# Patient Record
Sex: MALE | Race: OTHER | HISPANIC OR LATINO
[De-identification: names, ages, dates, MRNs, and addresses within clinical notes are randomized per-mention and may not be internally consistent; named-entity substitution may affect disease eponyms.]

---
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
## 2023-01-13 ENCOUNTER — RESULT CHARGE (OUTPATIENT)
Age: 18
End: 2023-01-13

## 2023-01-18 ENCOUNTER — APPOINTMENT (OUTPATIENT)
Dept: PEDIATRIC CARDIOLOGY | Facility: CLINIC | Age: 18
End: 2023-01-18
Payer: COMMERCIAL

## 2023-01-18 VITALS
SYSTOLIC BLOOD PRESSURE: 112 MMHG | DIASTOLIC BLOOD PRESSURE: 76 MMHG | WEIGHT: 181.66 LBS | BODY MASS INDEX: 29.2 KG/M2 | HEART RATE: 87 BPM | HEIGHT: 66.14 IN | OXYGEN SATURATION: 97 %

## 2023-01-18 PROCEDURE — 93320 DOPPLER ECHO COMPLETE: CPT

## 2023-01-18 PROCEDURE — 93303 ECHO TRANSTHORACIC: CPT

## 2023-01-18 PROCEDURE — 93000 ELECTROCARDIOGRAM COMPLETE: CPT

## 2023-01-18 PROCEDURE — 93325 DOPPLER ECHO COLOR FLOW MAPG: CPT

## 2023-01-18 PROCEDURE — 99214 OFFICE O/P EST MOD 30 MIN: CPT | Mod: 25

## 2023-01-18 NOTE — REASON FOR VISIT
[Follow-Up] : a follow-up visit for [Mother] : mother [S/P Catheterization] : status post catheterization [S/P EPS/Ablation] : status post EPS/Ablation [TOF/PA] : tetralogy of fallot with pulmonary atresia [Pulmonary Valve Insufficiency] : pulmonary valve insufficiency [Patient] : patient

## 2023-01-20 NOTE — DISCUSSION/SUMMARY
[Needs SBE Prophylaxis] : [unfilled]  needs bacterial endocarditis prophylaxis. SBE prophylaxis is indicated for dental and invasive ENT procedures. (Circulation. 2007; 116: 9327-4065) [Influenza vaccine is recommended] : Influenza vaccine is recommended [There are no changes in medication management.] : There are no changes in medication management [FreeTextEntry1] : In summary, Daniel is s/p repair of Tetralogy of Fallot, Pulmonary atresia, and s/p placement of a 25 mm bovine pericardial valve in the pulmonary position. He is now 3 1/2 years status post placement of a 26 mm Parker Rishabh valve in the pulmonary position; this was performed during a cardiac catheterization procedure on August 13, 2019. His cardiac evaluation today was notable for very mild ana-pulmonary valve stenosis, PSIG=14 mmHg; mean gradient=9 mmHg, and mild plus ana-pulmonary valve regurgitation. The estimated right ventricular pressure is approximately 21% systemic level, WNL. The right ventricle is globular and appears mildly dilated with mild global hypokinesia. His aortic root remains moderately dilated, z-score of 4.77, with only mild regurgitation. His LV ejection fraction remains normal at 57%. \par \par Daniel had a follow-up cardiac MRI/MRA in August 2021, (see above diagnostic test section for details on the results of this cardiac MRI/MRA).  The data on today's echocardiogram correlated quite nicely with the information obtained on this recent cardiac MRI/MRA.  At this time, Daniel has only mild neopulmonary stenosis and mild plus neopulmonary insufficiency with an estimated normal right ventricular pressure.  He has no evidence of branch pulmonary artery stenosis.  Also, his aortic root and ascending aorta are dilated, as is often seen in patients with tetralogy of Fallot.  These findings warrant close expectant follow-up, but no specific intervention at this point in time. He remains normotensive. \par  \par Daniel is asymptomatic today. He reports a very good activity level and has no complaints of dyspnea or shortness of breath with exertion. To date, we have documented no concerning arrhythmias and he has had no syncopal episodes.  A 24-hour Holter monitor was placed today and is currently pending.\par \par Daniel should remain on aspirin 325 mg once daily, indefinitely.  He denies excessive bruising or gum bleeding, on aspirin therapy.\par \par A follow-up cardiac MRI/MRA will be planned if clinically indicated, either by increasing neopulmonary valve gradients or worsening neopulmonary valve insufficiency.\par \par I cautioned Daniel about his weight, and I discussed the importance of a heart healthy diet, as well as the importance of regular aerobic exercise, with an eye towards preventive cardiology. I praised him on his willingness to work with a nutritionist, and his willingness to participate in more exercise related activities.  I emphasized the importance of consistent aerobic exercise at least 3-4 times per week.  I also emphasized the importance of excellent dental and skin hygiene, as prophylactic measures to prevent endocarditis. SBE prophylaxis is indicated. 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 I hope you find this information helpful to you.\par \par The above information was discussed with our cardiac interventionalist, Dr. Haro.  Expectant, close follow-up is recommended.

## 2023-01-20 NOTE — HISTORY OF PRESENT ILLNESS
[FreeTextEntry1] : Daniel Gandara was evaluated at the [facility] Daniel is now an almost 18 year-old young man who is followed in our division with a diagnosis of Tetralogy of Fallot and Pulmonary atresia. Daniel's last cardiac evaluation was on July 11, 2022. He is status post placement of a 26 mm Parker Rishabh valve in the pulmonary position. This was performed during a cardiac catheterization procedure on August 13, 2019. \par \par He was accompanied to the office visit today by his sister. He has received the COVID-19 vaccine (Pfizer), including the recommended boosters.  \par \par Cardiac history: Daniel is status post a right modified Juan-Taussig shunt performed on February 17, 2003. On July 7, 2003, he underwent repair of Tetralogy of Fallot with patch closure of the ventricular septal defect, takedown of the Juan-Taussig shunt and ligation of the patent ductus arteriosus. A transannular right ventricular outflow tract patch was placed at this time. On January 18, 2011, Dr. Riley placed a 25 mm bovine pericardial valve in the pulmonary valve position. A portion of the bovine pericardium was utilized as a patch for the right ventricular outflow tract. Over time, he developed mild to moderate ana-valvar pulmonary stenosis and increasing ana-pulmonary insufficiency.\par \par In December of 2017, Daniel had a follow-up cardiac MRI performed. The right ventricular ED volume was at the upper limits of normal (z-score =2.2); The RV ES volume was increased (z-score = 4.4). The left ventricular end systolic volume was mildly increased, z-score = 2.5. The left ventricular ejection fraction was 51%. The right ventricular ejection fraction was mildly to moderately decreased at 44%. 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 This area of the right ventricular outflow patch has poor contractility. Please see below for details.\par \par Because of an increasing gradient across the ana-pulmonary valve, increasing ana-pulmonary regurgitation, and decreased right ventricular systolic performance, Daniel was referred for a cardiac catheterization procedure on August 13, 2019. He was noted to have a 30 mm mercury gradient across the right ventricular outflow tract and moderate to severe pulmonary regurgitation. No intracardiac shunts were identified. His cardiac index was 2.6 L/min/m2. During this procedure a 26 mm Parker Rishabh valve was placed in the prior surgical valve, in the pulmonary position, with less than a 10 mm HG residual gradient and no pulmonary insufficiency. He tolerated the procedure well and was discharged home with no adverse sequelae.\par \par Daniel has been asymptomatic with reference to the cardiovascular system. He denies complaints of chest pain, palpitations, dizziness, shortness of breath or syncope. He remains overweight and is working hard to decrease his weight. He is trying to be more active.  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 He has been using his "home gym" equipment, and participating in at least 30 minutes of aerobic activity on Monday Wednesday and Friday.  He tells me he feels "amazing" during and after he exercises.  He has been trying to jog/walk briskly, every other day as well.\par \par His only medication includes aspirin 325 mg once daily.\par \par He has had no major intercurrent illnesses, hospitalizations or surgeries. 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 A brain MRI was performed, and by report was normal. He has some academic difficulties.  His last dental appointment was in November 2022.  He adheres to SBE prophylaxis. He is in need of this season's influenza vaccine. In the past, he was evaluated in pediatric endocrinology and found to have benign gynecomastia which required no further followup.  A review of systems was otherwise unremarkable.\par \par There has been no interval family history.

## 2023-01-20 NOTE — CARDIOLOGY SUMMARY
[Today's Date] : [unfilled] [LVSF ___%] : LV Shortening Fraction [unfilled]% [FreeTextEntry1] : An electrocardiogram today shows a normal sinus rhythm at a rate of 87 bpm, with a normal axis and a right bundle branch block pattern. There was no ectopy seen on the surface electrocardiogram. In summary, the EKG showed no significant interval change. [FreeTextEntry2] : A two-dimensional echocardiogram with Doppler evaluation shows no residual ventricular septal defect. The 26 mm Parker Rishabh valve was seen in the pulmonary position with very mild ana-pulmonary stenosis (PSIG of 14 mmHg, mean gradient of 9 mmHg). Mild plus ana-pulmonary regurgitation was seen. The proximal branch pulmonary arteries appeared normal. Color Doppler demonstrated flow into the proximal right and left branch pulmonary arteries. The aortic root was dilated and measured 4.4 cm in diameter consistent with a Z score of 4.77. Mild aortic valve regurgitation was noted.  The ascending aortic diameter was 4.2 cm, Z score of 5.35.  The right ventricle was globular and mildly dilated with mild global hypokinesia. The ventricular septal wall motion was dyskinetic/paradoxical. Qualitatively the left ventricular systolic function appeared normal. Mild tricuspid valve regurgitation was noted with an estimated right ventricular pressure of approximately 21% systemic level, no interval change. The LV ejection fraction by the 5/6*A*L method was normal at 57%.  The left ventricular volumes by this method were within normal limits. [de-identified] : pending\par \par December 6, 2021: This 24 Holter monitor revealed predominant normal sinus rhythm with a right bundle branch block pattern at a rate of 45 - 141 bpm. The average heart rate was 73 bpm. Rare isolated premature atrial contractions were noted; occasional late cycle, but multifocal, premature ventricular contractions were seen.  The PVCs were mostly isolated, with rare couplets.\par \par April 5, 2021: This 24 Holter monitor revealed predominant normal sinus rhythm with a right bundle branch block pattern at a rate of 45 - 140 bpm. The average heart rate was 78 bpm. Rare isolated premature atrial contractions were noted; occasional isolated, unifocal  premature ventricular contractions were seen.  A few PVCs were interpolated, most were late cycle.\par \par June 26, 2019: This 24 Holter monitor revealed predominant normal sinus rhythm with a right bundle branch block pattern at a rate of 49 - 160 bpm. The average heart rate was 89 bpm. Rare isolated premature atrial contractions were noted; occasional isolated, late cycle, unifocal  premature ventricular contractions were seen.\par \par 10/10/2018:This 24 Holter monitor revealed predominant normal sinus rhythm with a right bundle branch block pattern at a rate of 53 - 175 bpm. The average heart rate was 90 bpm. Rare isolated premature atrial contractions were noted;  occasional isolated, late cycle premature ventricular contractions were seen.\par \par 10/16/2017:  This 24 Holter monitor revealed predominant normal sinus rhythm with a right bundle branch block pattern at a rate of 49 - 171 bpm. The average heart rate was 82 bpm. Rare isolated premature atrial contractions were noted;  rare unifocal premature ventricular contractions were seen.\par \par 2/13/2017: This study revealed a predominant normal sinus rhythm with a right bundle branch block pattern. Rare premature atrial contractions were noted. Rare late cycle premature ventricular contractions of 2 morphologies were seen.\par \par 10/12/2015: The 24 hr Holter monitor revealed a predominant normal sinus rhythm with a right bundle branch block pattern. There were rare isolated premature atrial contractions and rare isolated, late cycle monomorphic premature ventricular contractions with no ventricular tachycardia. [de-identified] : August 4, 2021 [de-identified] : The cardiac MRI/MRA revealed mild plus ana-pulmonary regurgitation (18-24% PC; 21% by volume). The branch pulmonary arteries were of good size with no stenosis. There was an estimated flow of 54% of the right lung and 46% to the left lung.  The right ventricular ED volume (119 ml/m2) was at the upper limits of normal (z-score =2.3); The RV ES volume was increased. The left ventricular ejection fraction was 56.5%. The right ventricular ejection fraction was mildly decreased at 50%. 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 This area of the right ventricular outflow patch has poor contractility. The aortic root was dilated and measured 4.0 cm X 3.9 cm X 4.3 cm in systole (z-score=5.0). The ascending aorta was also dilated, 3.7 cm X 3.7 cm, Z score of 3.9. There was mild plus aortic regurgitation, RF of 23% by PC flows.\par \par December 20, 2017: The cardiac MRI/MRA revealed mild plus ana-pulmonary regurgitation (34% PC; 15% by volume). The branch pulmonary arteries were of good size with no stenosis. There was an estimated flow of 49% of the right lung and 51% to the left lung.  The right ventricular ED volume was at the upper limits of normal (z-score =2.2); The RV ES volume was increased (z-score = 4.4). The left ventricular ES volume was mildly increased, z-score = 2.5. The left ventricular ejection fraction was 51%. The right ventricular ejection fraction was mildly to moderately decreased at 44%. 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 This area of the right ventricular outflow patch has poor contractility. The aortic root was dilated and measured 3.6 cm X 3.7 cm X 4.1 cm in systole (z-score=5.0). The ascending aorta was also dilated. There was very mild aortic regurgitation.\par \par December 17, 2015:\par The cardiac MRI/MRA revealed minimal ana-pulmonary regurgitation (3%). The branch pulmonary arteries were of good size with no stenosis. There was an estimated flow of 60% of the right lung and 40% to the left lung.  The right ventricular and left ventricular volumes were normal. The left ventricular ejection fraction was 52%. The right ventricular ejection fraction was moderately decreased at 39%. 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 This area of the right ventricular outflow patch has poor contractility. The aortic root was dilated and measured 3.7 cm in systole. The ascending aorta was also dilated. [de-identified] : 8/13/2019 [FreeTextEntry3] :  He was noted to have a 30 mm mercury gradient across the right ventricular outflow tract and moderate to severe pulmonary regurgitation. No intracardiac shunts were identified. His cardiac index was 2.6 L/min/m2. During this procedure a 26 mm Edward Rishabh valve was placed in the prior surgical valve, in the pulmonary position, with less than a 10 mm HG residual gradient and no pulmonary insufficiency.

## 2023-01-20 NOTE — PHYSICAL EXAM
[General Appearance - Alert] : alert [General Appearance - In No Acute Distress] : in no acute distress [General Appearance - Well Developed] : well developed [General Appearance - Well-Appearing] : well appearing [Obese] : patient was observed to be obese [Outer Ear] : the ears and nose were normal in appearance [Examination Of The Oral Cavity] : mucous membranes were moist and pink [Respiration, Rhythm And Depth] : normal respiratory rhythm and effort [Auscultation Breath Sounds / Voice Sounds] : breath sounds clear to auscultation bilaterally [No Cough] : no cough [Chest Surgical / Traumatic Scar] : chest incision well healed [Sternotomy] : sternotomy [Heart Rate And Rhythm] : normal heart rate and rhythm [Heart Sounds Gallop] : no gallops [Heart Sounds Click] : no clicks [Arterial Pulses] : normal upper and lower extremity pulses with no pulse delay [Edema] : no edema [Capillary Refill Test] : normal capillary refill [Normal S1] : normal  [S2 Wide Splitting] : had wide splitting [Systolic] : systolic [II] : a grade 2/6 [LUSB] : LUSB [Ejection] : ejection [Diastolic] : diastolic [LMSB] : LMSB  [Abdomen Soft] : soft [Nail Clubbing] : no clubbing  or cyanosis of the fingernails [Abnormal Walk] : normal gait [] : no rash [Demonstrated Behavior - Infant Nonreactive To Parents] : interactive [Mood] : mood and affect were appropriate for age [Demonstrated Behavior] : normal behavior [I] : a grade 1/4  [FreeTextEntry1] : Striae on posterior thorax

## 2023-01-20 NOTE — CONSULT LETTER
[Today's Date] : [unfilled] [Name] : Name: [unfilled] [] : : ~~ [Today's Date:] : [unfilled] [Dear  ___:] : Dear Dr. [unfilled]: [Consult] : I had the pleasure of evaluating your patient, [unfilled]. My full evaluation follows. [Consult - Single Provider] : Thank you very much for allowing me to participate in the care of this patient. If you have any questions, please do not hesitate to contact me. [Sincerely,] : Sincerely, [FreeTextEntry4] : Marylou Gerardo MD [FreeTextEntry5] : [address] [facility] [address] [FreeTextEntry6] : [place] [address] [de-identified] : Lula Lim MD\par Pediatric Cardiologist\par [facility]\par [address] [place] [address]\par [place], [place]. [address]\par Phone: [phone]\par FAX: [phone]\par

## 2023-01-27 ENCOUNTER — APPOINTMENT (OUTPATIENT)
Dept: PEDIATRIC CARDIOLOGY | Facility: CLINIC | Age: 18
End: 2023-01-27
Payer: COMMERCIAL

## 2023-01-27 PROCEDURE — 93224 XTRNL ECG REC UP TO 48 HRS: CPT

## 2023-03-10 ENCOUNTER — OUTPATIENT (OUTPATIENT)
Dept: OUTPATIENT SERVICES | Age: 18
LOS: 1 days | End: 2023-03-10

## 2023-03-10 ENCOUNTER — APPOINTMENT (OUTPATIENT)
Dept: PEDIATRICS | Facility: CLINIC | Age: 18
End: 2023-03-10
Payer: COMMERCIAL

## 2023-03-10 VITALS
SYSTOLIC BLOOD PRESSURE: 102 MMHG | DIASTOLIC BLOOD PRESSURE: 57 MMHG | BODY MASS INDEX: 29.41 KG/M2 | HEIGHT: 66.22 IN | WEIGHT: 183 LBS | HEART RATE: 80 BPM

## 2023-03-10 DIAGNOSIS — Z98.890 OTHER SPECIFIED POSTPROCEDURAL STATES: Chronic | ICD-10-CM

## 2023-03-10 DIAGNOSIS — Q22.3 OTHER CONGENITAL MALFORMATIONS OF PULMONARY VALVE: Chronic | ICD-10-CM

## 2023-03-10 DIAGNOSIS — S76.012A STRAIN OF MUSCLE, FASCIA AND TENDON OF LEFT HIP, INITIAL ENCOUNTER: ICD-10-CM

## 2023-03-10 DIAGNOSIS — Z23 ENCOUNTER FOR IMMUNIZATION: ICD-10-CM

## 2023-03-10 DIAGNOSIS — Z87.2 PERSONAL HISTORY OF DISEASES OF THE SKIN AND SUBCUTANEOUS TISSUE: ICD-10-CM

## 2023-03-10 DIAGNOSIS — R43.0 ANOSMIA: ICD-10-CM

## 2023-03-10 DIAGNOSIS — Z86.39 PERSONAL HISTORY OF OTHER ENDOCRINE, NUTRITIONAL AND METABOLIC DISEASE: ICD-10-CM

## 2023-03-10 DIAGNOSIS — Z00.00 ENCOUNTER FOR GENERAL ADULT MEDICAL EXAMINATION W/OUT ABNORMAL FINDINGS: ICD-10-CM

## 2023-03-10 DIAGNOSIS — Z98.890 OTHER SPECIFIED POSTPROCEDURAL STATES: ICD-10-CM

## 2023-03-10 DIAGNOSIS — Z98.89 OTHER SPECIFIED POSTPROCEDURAL STATES: Chronic | ICD-10-CM

## 2023-03-10 PROCEDURE — 99395 PREV VISIT EST AGE 18-39: CPT | Mod: 25

## 2023-03-10 PROCEDURE — 90471 IMMUNIZATION ADMIN: CPT | Mod: NC

## 2023-03-10 PROCEDURE — 90686 IIV4 VACC NO PRSV 0.5 ML IM: CPT

## 2023-03-10 PROCEDURE — 96160 PT-FOCUSED HLTH RISK ASSMT: CPT | Mod: NC,59

## 2023-03-10 PROCEDURE — 99173 VISUAL ACUITY SCREEN: CPT | Mod: 59

## 2023-03-10 PROCEDURE — 96127 BRIEF EMOTIONAL/BEHAV ASSMT: CPT

## 2023-03-10 PROCEDURE — 92551 PURE TONE HEARING TEST AIR: CPT

## 2023-03-10 RX ORDER — AMOXICILLIN 500 MG/1
500 CAPSULE ORAL
Qty: 4 | Refills: 3 | Status: COMPLETED | COMMUNITY
Start: 2021-12-06 | End: 2023-03-10

## 2023-03-10 NOTE — RISK ASSESSMENT
[0] : 2) Feeling down, depressed, or hopeless: Not at all (0) [PHQ-2 Negative - No further assessment needed] : PHQ-2 Negative - No further assessment needed [Have you ever fainted, passed out or had an unexplained seizure suddenly and without warning, especially during exercise or in response] : Have you ever fainted, passed out or had an unexplained seizure suddenly and without warning, especially during exercise or in response to sudden loud noises such as doorbells, alarm clocks and ringing telephones? No [[id number]] : 0 [Have you ever had exercise-related chest pain or shortness of breath?] : Have you ever had exercise-related chest pain or shortness of breath? No [Has anyone in your immediate family (parents, grandparents, siblings) or other more distant relatives (aunts, uncles, cousins)  of heart] : Has anyone in your immediate family (parents, grandparents, siblings) or other more distant relatives (aunts, uncles, cousins)  of heart problems or had an unexpected sudden death before age 50 (This would include unexpected drownings, unexplained car accidents in which the relative was driving or sudden infant death syndrome.)? No [Are you related to anyone with hypertrophic cardiomyopathy or hypertrophic obstructive cardiomyopathy, Marfan syndrome, arrhythmogenic] : Are you related to anyone with hypertrophic cardiomyopathy or hypertrophic obstructive cardiomyopathy, Marfan syndrome, arrhythmogenic right ventricular cardiomyopathy, long QT syndrome, short QT syndrome, Brugada syndrome or catecholaminergic polymorphic ventricular tachycardia, or anyone younger than 50 years with a pacemaker or implantable defibrillator? No

## 2023-03-10 NOTE — DISCUSSION/SUMMARY
[] : The components of the vaccine(s) to be administered today are listed in the plan of care. The disease(s) for which the vaccine(s) are intended to prevent and the risks have been discussed with the caretaker.  The risks are also included in the appropriate vaccination information statements which have been provided to the patient's caregiver.  The caregiver has given consent to vaccinate. [Met privately with the adolescent for part of the office visit?] : Met privately with the adolescent for part of the office visit? Yes [Adolescent demonstrates understanding of his/her conditions and how to take prescribed medications?] : Adolescent demonstrates understanding of his/her conditions and how to take prescribed medications? Yes [Adolescent asks questions during each office  visit and participates in the care plan?] : Adolescent asks questions during each office visit and participates in the care plan? Yes [Adolescent is competent in independently making appointments, filling prescriptions, following up on referrals, and seeking emergency services, as needed?] : Adolescent is competent in independently making appointments, filling prescriptions, following up on referrals, and seeking emergency services, as needed? Yes [Adolescent's caregivers were provided with the opportunity to discuss their concerns about transferring decision making responsibility to the adolescent?] : Adolescent's caregivers were provided with the opportunity to discuss their concerns about transferring decision making responsibility to the adolescent? Yes [Initiated discussion about transfer to an adult healthcare provider?] : Initiated discussion about transfer to an adult healthcare provider? Yes  [Discussed choices for adult care and assist in identifying possible care providers?] : Discussed choices for adult care and assist in identifying possible care providers? Yes [FreeTextEntry1] : \par \par Daniel is a 17 yo male with history of tetralogy of Fallot s/p repair, pulmonary atresia s/p valve placement here for annual wellness physical.\par \par - asymptomatic, compliant with annual follow up with cardiology, only medication is aspirin 325mg qD\par - is planning to start a low-calorie diet. discussed heart-healthy foods such as legumes and leafy green vegetables, limiting fried foods, structuring diet plan as much as possible.\par - is planning to start daily 1 mile jogs with light weight lifting. Emphasized cardiology's recommendation regarding dilated aorta, to avoid heavy lifting activities such as push ups, lifting heavy weights, etc. \par - discussed sexual health, offered STD testing, discussed risk behaviors. \par - will give flu vaccine\par - due for surveillance labs: HgA1C, lipids, CBC\par - RTC in 1 year for annual well check or earlier if any concerns

## 2023-03-10 NOTE — PHYSICAL EXAM
[Alert] : alert [No Acute Distress] : no acute distress [Normocephalic] : normocephalic [EOMI Bilateral] : EOMI bilateral [Clear tympanic membranes with bony landmarks and light reflex present bilaterally] : clear tympanic membranes with bony landmarks and light reflex present bilaterally  [Pink Nasal Mucosa] : pink nasal mucosa [Nonerythematous Oropharynx] : nonerythematous oropharynx [Supple, full passive range of motion] : supple, full passive range of motion [No Palpable Masses] : no palpable masses [Clear to Auscultation Bilaterally] : clear to auscultation bilaterally [Regular Rate and Rhythm] : regular rate and rhythm [Normal S1, S2 audible] : normal S1, S2 audible [+2 Femoral Pulses] : +2 femoral pulses [NonTender] : non tender [Non Distended] : non distended [Normoactive Bowel Sounds] : normoactive bowel sounds [No Hepatomegaly] : no hepatomegaly [No Splenomegaly] : no splenomegaly [George: _____] : George [unfilled] [Uncircumcised] : uncircumcised [Foreskin easily retractable] : foreskin easily retractable [Bilateral descended testes] : bilateral descended testes [No Testicular Masses] : no testicular masses [No fissures] : no fissures [No Abnormal Lymph Nodes Palpated] : no abnormal lymph nodes palpated [Soft] : soft [Non Tender] : non tender [Mobile] : mobile [Normal Muscle Tone] : normal muscle tone [No Gait Asymmetry] : no gait asymmetry [No pain or deformities with palpation of bone, muscles, joints] : no pain or deformities with palpation of bone, muscles, joints [Straight] : straight [+2 Patella DTR] : +2 patella DTR [Cranial Nerves Grossly Intact] : cranial nerves grossly intact [No Rash or Lesions] : no rash or lesions [FreeTextEntry8] : Old, well-healed sternotomy scar

## 2023-03-10 NOTE — HISTORY OF PRESENT ILLNESS
[Mother] : mother [Eats meals with family] : eats meals with family [Has family members/adults to turn to for help] : has family members/adults to turn to for help [Is permitted and is able to make independent decisions] : Is permitted and is able to make independent decisions [Sleep Concerns] : sleep concerns [Grade: ____] : Grade: [unfilled] [Normal Performance] : normal performance [Normal Behavior/Attention] : normal behavior/attention [Normal Homework] : normal homework [Eats regular meals including adequate fruits and vegetables] : eats regular meals including adequate fruits and vegetables [Drinks non-sweetened liquids] : drinks non-sweetened liquids  [Calcium source] : calcium source [Has concerns about body or appearance] : has concerns about body or appearance [Has friends] : has friends [At least 1 hour of physical activity a day] : at least 1 hour of physical activity a day [Screen time (except homework) less than 2 hours a day] : screen time (except homework) less than 2 hours a day [Has interests/participates in community activities/volunteers] : has interests/participates in community activities/volunteers. [Uses safety belts/safety equipment] : uses safety belts/safety equipment  [Impaired/distracted driving] : impaired/distracted driving [Has peer relationships free of violence] : has peer relationships free of violence [HIV Screening Declined] : HIV Screening Declined [Has ways to cope with stress] : has ways to cope with stress [Displays self-confidence] : displays self-confidence [With Teen] : teen [No] : Patient does not go to dentist yearly [Up to date] : Up to date [Needs Immunizations] : needs immunizations [Uses electronic nicotine delivery system] : does not use electronic nicotine delivery system [Exposure to electronic nicotine delivery system] : no exposure to electronic nicotine delivery system [Uses tobacco] : does not use tobacco [Exposure to tobacco] : no exposure to tobacco [Uses drugs] : does not use drugs  [Exposure to drugs] : no exposure to drugs [Drinks alcohol] : does not drink alcohol [Exposure to alcohol] : no exposure to alcohol [Has problems with sleep] : does not have problems with sleep [Gets depressed, anxious, or irritable/has mood swings] : does not get depressed, anxious, or irritable/has mood swings [Has thought about hurting self or considered suicide] : has not thought about hurting self or considered suicide [FreeTextEntry7] : no UC or ED visits [de-identified] : none [de-identified] : will make an appointment [de-identified] : flu

## 2023-03-13 LAB
ALT SERPL-CCNC: 16 U/L
AST SERPL-CCNC: 8 U/L
BASOPHILS # BLD AUTO: 0.05 K/UL
BASOPHILS NFR BLD AUTO: 0.6 %
CHOLEST SERPL-MCNC: 116 MG/DL
EOSINOPHIL # BLD AUTO: 0.15 K/UL
EOSINOPHIL NFR BLD AUTO: 1.9 %
ESTIMATED AVERAGE GLUCOSE: 114 MG/DL
HBA1C MFR BLD HPLC: 5.6 %
HCT VFR BLD CALC: 43.6 %
HDLC SERPL-MCNC: 44 MG/DL
HGB BLD-MCNC: 14.8 G/DL
IMM GRANULOCYTES NFR BLD AUTO: 0.2 %
LDLC SERPL CALC-MCNC: 59 MG/DL
LYMPHOCYTES # BLD AUTO: 3.68 K/UL
LYMPHOCYTES NFR BLD AUTO: 45.4 %
MAN DIFF?: NORMAL
MCHC RBC-ENTMCNC: 30.6 PG
MCHC RBC-ENTMCNC: 33.9 GM/DL
MCV RBC AUTO: 90.1 FL
MONOCYTES # BLD AUTO: 0.63 K/UL
MONOCYTES NFR BLD AUTO: 7.8 %
NEUTROPHILS # BLD AUTO: 3.57 K/UL
NEUTROPHILS NFR BLD AUTO: 44.1 %
NONHDLC SERPL-MCNC: 73 MG/DL
PLATELET # BLD AUTO: 339 K/UL
RBC # BLD: 4.84 M/UL
RBC # FLD: 12.5 %
TRIGL SERPL-MCNC: 66 MG/DL
WBC # FLD AUTO: 8.1 K/UL

## 2023-03-14 DIAGNOSIS — Q21.3 TETRALOGY OF FALLOT: ICD-10-CM

## 2023-03-14 DIAGNOSIS — Z23 ENCOUNTER FOR IMMUNIZATION: ICD-10-CM

## 2023-03-14 DIAGNOSIS — Z00.00 ENCOUNTER FOR GENERAL ADULT MEDICAL EXAMINATION WITHOUT ABNORMAL FINDINGS: ICD-10-CM

## 2023-03-14 DIAGNOSIS — Z13.31 ENCOUNTER FOR SCREENING FOR DEPRESSION: ICD-10-CM

## 2023-03-14 DIAGNOSIS — E66.3 OVERWEIGHT: ICD-10-CM

## 2023-07-17 ENCOUNTER — APPOINTMENT (OUTPATIENT)
Dept: PEDIATRIC CARDIOLOGY | Facility: CLINIC | Age: 18
End: 2023-07-17
Payer: COMMERCIAL

## 2023-07-17 VITALS — DIASTOLIC BLOOD PRESSURE: 71 MMHG | SYSTOLIC BLOOD PRESSURE: 103 MMHG

## 2023-07-17 VITALS
HEIGHT: 66.14 IN | DIASTOLIC BLOOD PRESSURE: 78 MMHG | SYSTOLIC BLOOD PRESSURE: 126 MMHG | OXYGEN SATURATION: 98 % | WEIGHT: 189.38 LBS | HEART RATE: 72 BPM | BODY MASS INDEX: 30.44 KG/M2

## 2023-07-17 DIAGNOSIS — I77.819 AORTIC ECTASIA, UNSPECIFIED SITE: ICD-10-CM

## 2023-07-17 PROCEDURE — 93303 ECHO TRANSTHORACIC: CPT

## 2023-07-17 PROCEDURE — 99214 OFFICE O/P EST MOD 30 MIN: CPT | Mod: 25

## 2023-07-17 PROCEDURE — 93321 DOPPLER ECHO F-UP/LMTD STD: CPT | Mod: 59

## 2023-07-17 PROCEDURE — 93000 ELECTROCARDIOGRAM COMPLETE: CPT

## 2023-07-17 PROCEDURE — 93325 DOPPLER ECHO COLOR FLOW MAPG: CPT | Mod: 59

## 2023-07-17 PROCEDURE — 93304 ECHO TRANSTHORACIC: CPT

## 2023-07-17 PROCEDURE — 93320 DOPPLER ECHO COMPLETE: CPT

## 2023-07-17 NOTE — REASON FOR VISIT
[Follow-Up] : a follow-up visit for [Mother] : mother [S/P Cardiac Surgery] : status post cardiac surgery [S/P Catheterization] : status post catheterization [TOF/PA] : tetralogy of fallot with pulmonary atresia

## 2023-07-18 NOTE — CONSULT LETTER
[Today's Date] : [unfilled] [Name] : Name: [unfilled] [] : : ~~ [Today's Date:] : [unfilled] [Dear  ___:] : Dear Dr. [unfilled]: [Consult] : I had the pleasure of evaluating your patient, [unfilled]. My full evaluation follows. [Consult - Single Provider] : Thank you very much for allowing me to participate in the care of this patient. If you have any questions, please do not hesitate to contact me. [Sincerely,] : Sincerely, [FreeTextEntry4] : Alan Chung [FreeTextEntry5] : [address], [FreeTextEntry6] :  [place] [address] [FreeTextEnqsq1] : [phone] [de-identified] : Lula Lim MD\par Pediatric Cardiologist\par [facility]\par [address] [place] [address]\par [place], [place]. [address]\par Phone: [phone]\par FAX: [phone]\par

## 2023-07-18 NOTE — CARDIOLOGY SUMMARY
[Today's Date] : [unfilled] [LVSF ___%] : LV Shortening Fraction [unfilled]% [FreeTextEntry1] : An electrocardiogram today shows a normal sinus rhythm at a rate of 71 bpm, with a normal axis and a right bundle branch block pattern. There was no ectopy seen on the surface electrocardiogram. In summary, the EKG showed no significant interval change. [FreeTextEntry2] : A two-dimensional echocardiogram with Doppler evaluation shows no residual ventricular septal defect. The 26 mm Parker Rishabh valve was seen in the pulmonary position with very mild ana-pulmonary stenosis (PSIG of 11 mmHg, mean gradient of 8 mmHg). Mild plus ana-pulmonary regurgitation was seen. The proximal branch pulmonary arteries appeared normal. Color Doppler demonstrated flow into the proximal right and left branch pulmonary arteries. The aortic root was dilated and measured 4.4 cm in diameter consistent with a Z score of 4.54. Mild aortic valve regurgitation was noted.  The ascending aortic diameter was 4.2 cm, Z score of 5.3.  The right ventricle was globular and mildly dilated with mild global hypokinesia. The ventricular septal wall motion was dyskinetic/paradoxical. Qualitatively the left ventricular systolic function appeared normal. Mild tricuspid valve regurgitation was noted with an estimated right ventricular pressure of approximately 21% systemic level, no interval change. The LV ejection fraction by the 5/6*A*L method was normal at 57%.  The left ventricular volumes by this method were within normal limits. No interval change. [de-identified] : January 18, 2023  [de-identified] : This 24 Holter monitor revealed predominant normal sinus rhythm with a right bundle branch block pattern at a rate of 47 - 205 bpm. The average heart rate was 93 bpm. Rare isolated premature atrial contractions were noted; occasional late cycle, premature ventricular contractions were seen\par \par December 6, 2021: This 24 Holter monitor revealed predominant normal sinus rhythm with a right bundle branch block pattern at a rate of 45 - 141 bpm. The average heart rate was 73 bpm. Rare isolated premature atrial contractions were noted; occasional late cycle, but multifocal, premature ventricular contractions were seen.  The PVCs were mostly isolated, with rare couplets.\par \par April 5, 2021: This 24 Holter monitor revealed predominant normal sinus rhythm with a right bundle branch block pattern at a rate of 45 - 140 bpm. The average heart rate was 78 bpm. Rare isolated premature atrial contractions were noted; occasional isolated, unifocal  premature ventricular contractions were seen.  A few PVCs were interpolated, most were late cycle.\par \par June 26, 2019: This 24 Holter monitor revealed predominant normal sinus rhythm with a right bundle branch block pattern at a rate of 49 - 160 bpm. The average heart rate was 89 bpm. Rare isolated premature atrial contractions were noted; occasional isolated, late cycle, unifocal  premature ventricular contractions were seen.\par \par 10/10/2018:This 24 Holter monitor revealed predominant normal sinus rhythm with a right bundle branch block pattern at a rate of 53 - 175 bpm. The average heart rate was 90 bpm. Rare isolated premature atrial contractions were noted;  occasional isolated, late cycle premature ventricular contractions were seen.\par \par 10/16/2017:  This 24 Holter monitor revealed predominant normal sinus rhythm with a right bundle branch block pattern at a rate of 49 - 171 bpm. The average heart rate was 82 bpm. Rare isolated premature atrial contractions were noted;  rare unifocal premature ventricular contractions were seen.\par \par 2/13/2017: This study revealed a predominant normal sinus rhythm with a right bundle branch block pattern. Rare premature atrial contractions were noted. Rare late cycle premature ventricular contractions of 2 morphologies were seen.\par \par 10/12/2015: The 24 hr Holter monitor revealed a predominant normal sinus rhythm with a right bundle branch block pattern. There were rare isolated premature atrial contractions and rare isolated, late cycle monomorphic premature ventricular contractions with no ventricular tachycardia. [de-identified] : August 4, 2021 [de-identified] : The cardiac MRI/MRA revealed mild plus ana-pulmonary regurgitation (18-24% PC; 21% by volume). The branch pulmonary arteries were of good size with no stenosis. There was an estimated flow of 54% of the right lung and 46% to the left lung.  The right ventricular ED volume (119 ml/m2) was at the upper limits of normal (z-score =2.3); The RV ES volume was increased. The left ventricular ejection fraction was 56.5%. The right ventricular ejection fraction was mildly decreased at 50%. 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 This area of the right ventricular outflow patch has poor contractility. The aortic root was dilated and measured 4.0 cm X 3.9 cm X 4.3 cm in systole (z-score=5.0). The ascending aorta was also dilated, 3.7 cm X 3.7 cm, Z score of 3.9. There was mild plus aortic regurgitation, RF of 23% by PC flows.\par \par December 20, 2017: The cardiac MRI/MRA revealed mild plus ana-pulmonary regurgitation (34% PC; 15% by volume). The branch pulmonary arteries were of good size with no stenosis. There was an estimated flow of 49% of the right lung and 51% to the left lung.  The right ventricular ED volume was at the upper limits of normal (z-score =2.2); The RV ES volume was increased (z-score = 4.4). The left ventricular ES volume was mildly increased, z-score = 2.5. The left ventricular ejection fraction was 51%. The right ventricular ejection fraction was mildly to moderately decreased at 44%. 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 This area of the right ventricular outflow patch has poor contractility. The aortic root was dilated and measured 3.6 cm X 3.7 cm X 4.1 cm in systole (z-score=5.0). The ascending aorta was also dilated. There was very mild aortic regurgitation.\par \par December 17, 2015:\par The cardiac MRI/MRA revealed minimal ana-pulmonary regurgitation (3%). The branch pulmonary arteries were of good size with no stenosis. There was an estimated flow of 60% of the right lung and 40% to the left lung.  The right ventricular and left ventricular volumes were normal. The left ventricular ejection fraction was 52%. The right ventricular ejection fraction was moderately decreased at 39%. 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 This area of the right ventricular outflow patch has poor contractility. The aortic root was dilated and measured 3.7 cm in systole. The ascending aorta was also dilated. [de-identified] : 8/13/2019 [FreeTextEntry3] :  He was noted to have a 30 mm mercury gradient across the right ventricular outflow tract and moderate to severe pulmonary regurgitation. No intracardiac shunts were identified. His cardiac index was 2.6 L/min/m2. During this procedure a 26 mm Edward Rishabh valve was placed in the prior surgical valve, in the pulmonary position, with less than a 10 mm HG residual gradient and no pulmonary insufficiency.

## 2023-07-18 NOTE — PHYSICAL EXAM
[General Appearance - Alert] : alert [General Appearance - In No Acute Distress] : in no acute distress [General Appearance - Well Developed] : well developed [General Appearance - Well-Appearing] : well appearing [Obese] : patient was observed to be obese [Outer Ear] : the ears and nose were normal in appearance [Examination Of The Oral Cavity] : mucous membranes were moist and pink [Oropharynx] : the oropharynx was normal [Respiration, Rhythm And Depth] : normal respiratory rhythm and effort [Auscultation Breath Sounds / Voice Sounds] : breath sounds clear to auscultation bilaterally [No Cough] : no cough [Chest Surgical / Traumatic Scar] : chest incision well healed [Sternotomy] : sternotomy [Heart Rate And Rhythm] : normal heart rate and rhythm [Heart Sounds Gallop] : no gallops [Arterial Pulses] : normal upper and lower extremity pulses with no pulse delay [Heart Sounds Click] : no clicks [Edema] : no edema [Capillary Refill Test] : normal capillary refill [Normal S1] : normal  [S2 Wide Splitting] : had wide splitting [Systolic] : systolic [LUSB] : LUSB [Ejection] : ejection [Diastolic] : diastolic [II] : a grade 2/4  [LMSB] : LMSB  [Abdomen Soft] : soft [Nail Clubbing] : no clubbing  or cyanosis of the fingernails [Abnormal Walk] : normal gait [] : no rash [Demonstrated Behavior - Infant Nonreactive To Parents] : interactive [Mood] : mood and affect were appropriate for age [Demonstrated Behavior] : normal behavior [FreeTextEntry1] : Striae on posterior thorax

## 2023-07-18 NOTE — DISCUSSION/SUMMARY
[Needs SBE Prophylaxis] : [unfilled]  needs bacterial endocarditis prophylaxis. SBE prophylaxis is indicated for dental and invasive ENT procedures. (Circulation. 2007; 116: 4028-7440) [Influenza vaccine is recommended] : Influenza vaccine is recommended [There are no changes in medication management.] : There are no changes in medication management [FreeTextEntry1] : In summary, Daniel is s/p repair of Tetralogy of Fallot, Pulmonary atresia, and s/p placement of a 25 mm bovine pericardial valve in the pulmonary position. He is now ~4 years status post placement of a 26 mm Parker Rishabh valve in the pulmonary position; this was performed during a cardiac catheterization procedure on August 13, 2019. His cardiac evaluation today was notable for no significant ana-pulmonary valve stenosis, PSIG=11 mmHg; mean gradient=8 mmHg, and mild plus ana-pulmonary valve regurgitation. The estimated right ventricular pressure is approximately 21% systemic level, WNL. The right ventricle is globular and appears mildly dilated with mild global hypokinesia. His aortic root remains moderately dilated and measures 4.4 cm, z-score of 4.54, with only mild regurgitation. His LV ejection fraction remains normal at 57%.  In summary, his echocardiogram today shows no interval change compared to that obtained in January 2023.\par \par Daniel had a follow-up cardiac MRI/MRA in August 2021, (see above diagnostic test section for details on the results of this cardiac MRI/MRA).  The data on today's echocardiogram correlated quite nicely with the information obtained on this recent cardiac MRI/MRA.  At this time, Daniel has only mild neopulmonary stenosis and mild plus neopulmonary insufficiency with an estimated normal right ventricular pressure.  He has no evidence of branch pulmonary artery stenosis.  Also, his aortic root and ascending aorta are dilated, as is often seen in patients with tetralogy of Fallot.  These findings warrant close expectant follow-up, but no specific intervention at this point in time. He remains normotensive. \par  \par Daniel remains asymptomatic. He reports a very good activity level and has no complaints of dyspnea or shortness of breath with exertion. To date, we have documented no concerning arrhythmias and he has had no syncopal episodes.  His last 24-hour Holter monitor from January 2023, showed no concerning arrhythmias.\par \par Daniel should remain on aspirin 325 mg once daily, indefinitely.  He denies excessive bruising or gum bleeding, on aspirin therapy.\par \par A follow-up cardiac MRI/MRA will be planned if clinically indicated, either by increasing neopulmonary valve gradients or worsening neopulmonary valve insufficiency.\par \par I cautioned Daniel about his weight, and I discussed the importance of a heart healthy diet, as well as the importance of regular aerobic exercise, with an eye towards preventive cardiology. I praised him on his willingness to work with a nutritionist, and his willingness to participate in more exercise related activities.  I emphasized the importance of consistent aerobic exercise at least 3-4 times per week.  I also emphasized the importance of excellent dental and skin hygiene, as prophylactic measures to prevent endocarditis. SBE prophylaxis is indicated. 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 I hope you find this information helpful to you.\par \par In January 2023, the above information was discussed with our cardiac interventionalist, Dr. Haro.  Expectant, close follow-up is recommended.

## 2023-07-18 NOTE — HISTORY OF PRESENT ILLNESS
[FreeTextEntry1] : Daniel Gandara was evaluated at the [facility] on July 17, 2023.  Daniel is now a 20 1/2year-old young man who is followed in our division with a diagnosis of Tetralogy of Fallot and Pulmonary atresia. Daniel's last cardiac evaluation was on January 18, 2023.  He is status post placement of a 26 mm Parker Rishabh valve in the pulmonary position. This was performed during a cardiac catheterization procedure on August 13, 2019. \par \par He was accompanied to the office visit today by his mother.   \par \par Cardiac history: Daniel is status post a right modified Juan-Taussig shunt performed on February 17, 2003. On July 7, 2003, he underwent repair of Tetralogy of Fallot with patch closure of the ventricular septal defect, takedown of the Juan-Taussig shunt and ligation of the patent ductus arteriosus. A transannular right ventricular outflow tract patch was placed at this time. On January 18, 2011, Dr. Riley placed a 25 mm bovine pericardial valve in the pulmonary valve position. A portion of the bovine pericardium was utilized as a patch for the right ventricular outflow tract. Over time, he developed mild to moderate ana-valvar pulmonary stenosis and increasing ana-pulmonary insufficiency.\par \par In December of 2017, Daniel had a follow-up cardiac MRI performed. The right ventricular ED volume was at the upper limits of normal (z-score =2.2); The RV ES volume was increased (z-score = 4.4). The left ventricular end systolic volume was mildly increased, z-score = 2.5. The left ventricular ejection fraction was 51%. The right ventricular ejection fraction was mildly to moderately decreased at 44%. 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 This area of the right ventricular outflow patch has poor contractility. Please see below for details.\par \par Because of an increasing gradient across the ana-pulmonary valve, increasing ana-pulmonary regurgitation, and decreased right ventricular systolic performance, Daniel was referred for a cardiac catheterization procedure on August 13, 2019. He was noted to have a 30 mm mercury gradient across the right ventricular outflow tract and moderate to severe pulmonary regurgitation. No intracardiac shunts were identified. His cardiac index was 2.6 L/min/m2. During this procedure a 26 mm Parker Rishabh valve was placed in the prior surgical valve, in the pulmonary position, with less than a 10 mm HG residual gradient and no pulmonary insufficiency. He tolerated the procedure well and was discharged home with no adverse sequelae.\par \par Daniel has been asymptomatic with reference to the cardiovascular system. He denies complaints of chest pain, palpitations, dizziness, shortness of breath or syncope. He remains overweight and is working hard to decrease his weight. He is trying to be more active.  He is in his third year of college at [email or web address] studying mechanical engineering. He has been trying to jog/walk briskly, as a form of exercise.\par \par His only medication includes aspirin 325 mg once daily.\par \par He has had no major intercurrent illnesses, hospitalizations or surgeries. 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 A brain MRI was performed, and by report was normal. He has some academic difficulties.  His next dental appointment is at the end of July, 2023.  He adheres to SBE prophylaxis.  He has received the COVID-19 vaccine (Pfizer), including the recommended boosters. In the past, he was evaluated in pediatric endocrinology and found to have benign gynecomastia which required no further followup.  A review of systems was otherwise unremarkable.\par \par There has been no interval family history.

## 2023-07-19 ENCOUNTER — APPOINTMENT (OUTPATIENT)
Dept: PEDIATRIC CARDIOLOGY | Facility: CLINIC | Age: 18
End: 2023-07-19

## 2023-12-20 ENCOUNTER — RESULT CHARGE (OUTPATIENT)
Age: 18
End: 2023-12-20

## 2024-01-02 ENCOUNTER — APPOINTMENT (OUTPATIENT)
Dept: PEDIATRIC CARDIOLOGY | Facility: CLINIC | Age: 19
End: 2024-01-02
Payer: COMMERCIAL

## 2024-01-02 VITALS
WEIGHT: 208.34 LBS | OXYGEN SATURATION: 98 % | SYSTOLIC BLOOD PRESSURE: 122 MMHG | RESPIRATION RATE: 18 BRPM | BODY MASS INDEX: 33.09 KG/M2 | HEART RATE: 85 BPM | HEIGHT: 66.54 IN | DIASTOLIC BLOOD PRESSURE: 79 MMHG

## 2024-01-02 PROCEDURE — 99215 OFFICE O/P EST HI 40 MIN: CPT | Mod: 25

## 2024-01-02 PROCEDURE — 93303 ECHO TRANSTHORACIC: CPT

## 2024-01-02 PROCEDURE — 93000 ELECTROCARDIOGRAM COMPLETE: CPT

## 2024-01-02 PROCEDURE — 93320 DOPPLER ECHO COMPLETE: CPT

## 2024-01-02 PROCEDURE — 93325 DOPPLER ECHO COLOR FLOW MAPG: CPT

## 2024-01-02 RX ORDER — AMOXICILLIN 500 MG/1
500 TABLET, FILM COATED ORAL
Qty: 4 | Refills: 3 | Status: ACTIVE | COMMUNITY
Start: 2023-07-17 | End: 1900-01-01

## 2024-01-03 NOTE — DISCUSSION/SUMMARY
[There are no changes in medication management.] : There are no changes in medication management [Needs SBE Prophylaxis] : [unfilled]  needs bacterial endocarditis prophylaxis. SBE prophylaxis is indicated for dental and invasive ENT procedures. (Circulation. 2007; 116: 9782[address]0747) [Influenza vaccine is recommended] : Influenza vaccine is recommended [FreeTextEntry1] : In summary, Daniel is s/p repair of Tetralogy of Fallot, Pulmonary atresia, and s/p placement of a 25 mm bovine pericardial valve in the pulmonary position. He is now ~4 1/2 years status post placement of a 26 mm Parker Rishabh valve in the pulmonary position; this was performed during a cardiac catheterization procedure on August 13, 2019. His cardiac evaluation today was notable for no significant ana-pulmonary valve stenosis, PSIG=16 mmHg; mean gradient=10 mmHg, and mild to moderate ana-pulmonary valve regurgitation. The estimated right ventricular pressure is approximately 21% systemic level, WNL. The right ventricle is globular and appears mildly dilated with mild global hypokinesia. His aortic root remains moderately dilated and measures 4.2 cm, z-score of 3.4, with only mild regurgitation. His LV ejection fraction remains normal at 59%.  In summary, his echocardiogram today shows no significant interval change compared to that obtained in January 2023.  Daniel had a follow-up cardiac MRI/MRA in August 2021, (see above diagnostic test section for details on the results of this cardiac MRI/MRA).  The data on today's echocardiogram correlated quite nicely with the information obtained on this recent cardiac MRI/MRA.  At this time, Daniel has only mild neopulmonary stenosis and mild plus neopulmonary insufficiency with an estimated normal right ventricular pressure.  He has no evidence of branch pulmonary artery stenosis.  Also, his aortic root and ascending aorta are dilated, as is often seen in patients with tetralogy of Fallot.  These findings warrant close expectant follow-up, but no specific intervention at this point in time. He remains normotensive.    Daniel remains asymptomatic. He reports a very good activity level and has no complaints of dyspnea or shortness of breath with exertion. To date, we have documented no concerning arrhythmias and he has had no syncopal episodes.  His last 24-hour Holter monitor from January 2023, showed no concerning arrhythmias.  A 24-hour Holter monitor was placed today and is currently pending.  Daniel should remain on aspirin 325 mg once daily, indefinitely.  He denies excessive bruising or gum bleeding, on aspirin therapy.  A follow-up cardiac MRI/MRA will be planned if clinically indicated, either by increasing neopulmonary valve gradients or worsening neopulmonary valve insufficiency.  I cautioned Daniel about his weight, and I discussed the importance of a heart healthy diet, as well as the importance of regular aerobic exercise, with an eye towards preventive cardiology. I emphasized the importance of consistent aerobic exercise at least 3-4 times per week.  I also emphasized the importance of excellent dental and skin hygiene, as prophylactic measures to prevent endocarditis. SBE prophylaxis is indicated. I reviewed with Daniel and his family that should he develop any protracted, unexplained fevers, that they should contact our office for advice and evaluation.  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  Today, I discussed at length with Daniel and his mother, the importance of transitioning his cardiology care to our adult congenital heart disease program.  He and his mother are in agreement with this transition.  They were provided with the contact information to make their next appointment in 6 months with Dr. Manuela Feng in the [facility] clinic.  I hope you find this information helpful to you.  Total time spent today included reviewing diagnosis and treatment plan/monitoring, review of prior notes, review of medications and monitoring for side effects, review of last labs with patient/family, plan for continued symptom and laboratory monitoring as ordered, and time spent with patient/parent. Reviewed recent chart notes from other providers and medical records as well. This excludes time spent on procedures.

## 2024-01-03 NOTE — CONSULT LETTER
[Today's Date] : [unfilled] [Name] : Name: [unfilled] [] : : ~~ [Today's Date:] : [unfilled] [Dear  ___:] : Dear Dr. [unfilled]: [Consult] : I had the pleasure of evaluating your patient, [unfilled]. My full evaluation follows. [Consult - Single Provider] : Thank you very much for allowing me to participate in the care of this patient. If you have any questions, please do not hesitate to contact me. [Sincerely,] : Sincerely, [FreeTextEntry4] : [facility] [FreeTextEntry5] :  [address] [FreeTextEntry6] :  [place] [address] [FreeTextEntry7] : [phone] [de-identified] : Lula Lim MD Pediatric Cardiologist [facility], [address], [place] [address] Phone: [phone] FAX: [phone]

## 2024-01-03 NOTE — PHYSICAL EXAM
[General Appearance - Alert] : alert [General Appearance - In No Acute Distress] : in no acute distress [General Appearance - Well Developed] : well developed [General Appearance - Well-Appearing] : well appearing [Obese] : patient was observed to be obese [Outer Ear] : the ears and nose were normal in appearance [Examination Of The Oral Cavity] : mucous membranes were moist and pink [Oropharynx] : the oropharynx was normal [Respiration, Rhythm And Depth] : normal respiratory rhythm and effort [Auscultation Breath Sounds / Voice Sounds] : breath sounds clear to auscultation bilaterally [No Cough] : no cough [Chest Surgical / Traumatic Scar] : chest incision well healed [Sternotomy] : sternotomy [Heart Rate And Rhythm] : normal heart rate and rhythm [Heart Sounds Gallop] : no gallops [Heart Sounds Click] : no clicks [Arterial Pulses] : normal upper and lower extremity pulses with no pulse delay [Edema] : no edema [Capillary Refill Test] : normal capillary refill [Normal S1] : normal  [S2 Wide Splitting] : had wide splitting [Systolic] : systolic [LUSB] : LUSB [Ejection] : ejection [Diastolic] : diastolic [II] : a grade 2/4  [LMSB] : LMSB  [Abdomen Soft] : soft [Nail Clubbing] : no clubbing  or cyanosis of the fingernails [Abnormal Walk] : normal gait [] : no rash [Demonstrated Behavior - Infant Nonreactive To Parents] : interactive [Demonstrated Behavior] : normal behavior [Mood] : mood and affect were appropriate for age [FreeTextEntry1] : Striae on posterior thorax

## 2024-01-03 NOTE — REASON FOR VISIT
[Follow-Up] : a follow-up visit for [Patient] : patient [Mother] : mother [FreeTextEntry3] : s/p cardiac surgery, s/p catheterization, TOF w/ pulmonary atresia

## 2024-01-03 NOTE — CARDIOLOGY SUMMARY
[Today's Date] : [unfilled] [LVSF ___%] : LV Shortening Fraction [unfilled]% [de-identified] : January 18, 2023  [FreeTextEntry1] : An electrocardiogram today shows a normal sinus rhythm at a rate of 76 bpm, with a normal axis and a right bundle branch block pattern. There was no ectopy seen on the surface electrocardiogram. In summary, the EKG showed no significant interval change. [FreeTextEntry2] : Summary:  1. 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 2. Status post takedown of right modified Juan-Taussig shunt, status post closure of anterior  malalignment ventricular septal defect and right ventricular outflow tract transannular patch. 3. No residual ventricular septal defect. 4. Status post surgical pulmonary valve replacement with bovine pericardial valve. January 18, 2011. 5. S/P placement of a 26 mm Rishabh valve in the pulmonary position, 8/13/2019. 6. Very mild neopulmonary stenosis. 7. Peak pulmonary valve gradient = 16.2 mmHg (mean grad = 9.8 mmHg). 8. Mild to moderate pulmonary valve regurgitation. 9. Mild tricuspid valve regurgitation, peak systolic instantaneous gradient 18.7 mmHg. 10. Right ventricular systolic pressure estimate = 23.7 mmHg (estimated right atrial pressure of 5 mmHg). 11. The estimated right ventricular pressure is approximately 21% systemic level, WNL. - no interval  change. 12. Normal right branch pulmonary artery, continuity of the left and right branch pulmonary arteries and the left branch pulmonary artery was not well visualized. 13. Normal Doppler profile in right pulmonary artery and normal Doppler profile in left pulmonary artery. 14. Color Doppler demonstrates flow into the proximal right and left branch pulmonary arteries. 15. Mildly dilated aortic root. 16. Aortic sinuses of Valsalva dimension (systole) = 4.2 cm (z = 3.44). 17. Mild aortic valve regurgitation. 18. The aortic insufficiency jet is eccentric and directed towards the VSD patch.  19. The sino-tubular junction is effaced. 20. Moderately dilated ascending aorta. 21. Ascending aorta dimension (systole) = 4.44 cm (z = 5.67). 22. Trivial mitral valve regurgitation. 23. There is dyskinetic, paradoxical ventricular septal wall motion. 24. Normal left ventricular size, morphology and systolic function. 25. Left ventricular ejection fraction by 5/6 Area x Length is normal at 59 %. 26. The LV volumes by this method were WNL. 27. The right ventricular myocardium appears mildly myopathic. The RV is globular and mildly to  moderately dilated.  28. Mild global hypokinesia of the right ventricle. 29. No pericardial effusion. [de-identified] : pending  January 18, 2023: This 24 Holter monitor revealed predominant normal sinus rhythm with a right bundle branch block pattern at a rate of 47 - 205 bpm. The average heart rate was 93 bpm. Rare isolated premature atrial contractions were noted; occasional late cycle, premature ventricular contractions were seen.  December 6, 2021: This 24 Holter monitor revealed predominant normal sinus rhythm with a right bundle branch block pattern at a rate of 45 - 141 bpm. The average heart rate was 73 bpm. Rare isolated premature atrial contractions were noted; occasional late cycle, but multifocal, premature ventricular contractions were seen.  The PVCs were mostly isolated, with rare couplets.  April 5, 2021: This 24 Holter monitor revealed predominant normal sinus rhythm with a right bundle branch block pattern at a rate of 45 - 140 bpm. The average heart rate was 78 bpm. Rare isolated premature atrial contractions were noted; occasional isolated, unifocal  premature ventricular contractions were seen.  A few PVCs were interpolated, most were late cycle.  June 26, 2019: This 24 Holter monitor revealed predominant normal sinus rhythm with a right bundle branch block pattern at a rate of 49 - 160 bpm. The average heart rate was 89 bpm. Rare isolated premature atrial contractions were noted; occasional isolated, late cycle, unifocal  premature ventricular contractions were seen.  10/10/2018:This 24 Holter monitor revealed predominant normal sinus rhythm with a right bundle branch block pattern at a rate of 53 - 175 bpm. The average heart rate was 90 bpm. Rare isolated premature atrial contractions were noted;  occasional isolated, late cycle premature ventricular contractions were seen.  10/16/2017:  This 24 Holter monitor revealed predominant normal sinus rhythm with a right bundle branch block pattern at a rate of 49 - 171 bpm. The average heart rate was 82 bpm. Rare isolated premature atrial contractions were noted;  rare unifocal premature ventricular contractions were seen.  2/13/2017: This study revealed a predominant normal sinus rhythm with a right bundle branch block pattern. Rare premature atrial contractions were noted. Rare late cycle premature ventricular contractions of 2 morphologies were seen.  10/12/2015: The 24 hr Holter monitor revealed a predominant normal sinus rhythm with a right bundle branch block pattern. There were rare isolated premature atrial contractions and rare isolated, late cycle monomorphic premature ventricular contractions with no ventricular tachycardia. [de-identified] : August 4, 2021 [de-identified] : The cardiac MRI/MRA revealed mild plus ana-pulmonary regurgitation (18-24% PC; 21% by volume). The branch pulmonary arteries were of good size with no stenosis. There was an estimated flow of 54% of the right lung and 46% to the left lung.  The right ventricular ED volume (119 ml/m2) was at the upper limits of normal (z-score =2.3); The RV ES volume was increased. The left ventricular ejection fraction was 56.5%. The right ventricular ejection fraction was mildly decreased at 50%. 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 This area of the right ventricular outflow patch has poor contractility. The aortic root was dilated and measured 4.0 cm X 3.9 cm X 4.3 cm in systole (z-score=5.0). The ascending aorta was also dilated, 3.7 cm X 3.7 cm, Z score of 3.9. There was mild plus aortic regurgitation, RF of 23% by PC flows.\par  \par  December 20, 2017: The cardiac MRI/MRA revealed mild plus ana-pulmonary regurgitation (34% PC; 15% by volume). The branch pulmonary arteries were of good size with no stenosis. There was an estimated flow of 49% of the right lung and 51% to the left lung.  The right ventricular ED volume was at the upper limits of normal (z-score =2.2); The RV ES volume was increased (z-score = 4.4). The left ventricular ES volume was mildly increased, z-score = 2.5. The left ventricular ejection fraction was 51%. The right ventricular ejection fraction was mildly to moderately decreased at 44%. 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 This area of the right ventricular outflow patch has poor contractility. The aortic root was dilated and measured 3.6 cm X 3.7 cm X 4.1 cm in systole (z-score=5.0). The ascending aorta was also dilated. There was very mild aortic regurgitation.\par  \par  December 17, 2015:\par  The cardiac MRI/MRA revealed minimal ana-pulmonary regurgitation (3%). The branch pulmonary arteries were of good size with no stenosis. There was an estimated flow of 60% of the right lung and 40% to the left lung.  The right ventricular and left ventricular volumes were normal. The left ventricular ejection fraction was 52%. The right ventricular ejection fraction was moderately decreased at 39%. 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 This area of the right ventricular outflow patch has poor contractility. The aortic root was dilated and measured 3.7 cm in systole. The ascending aorta was also dilated. [de-identified] : 8/13/2019 [FreeTextEntry3] :  He was noted to have a 30 mm mercury gradient across the right ventricular outflow tract and moderate to severe pulmonary regurgitation. No intracardiac shunts were identified. His cardiac index was 2.6 L/min/m2. During this procedure a 26 mm Edward Rishabh valve was placed in the prior surgical valve, in the pulmonary position, with less than a 10 mm HG residual gradient and no pulmonary insufficiency.

## 2024-01-03 NOTE — HISTORY OF PRESENT ILLNESS
[FreeTextEntry1] : Daniel Gandara was evaluated at the [facility] on January 2, 2024. Daniel is now an almost 19 year-old young man who is followed in our division with a diagnosis of Tetralogy of Fallot and Pulmonary atresia. Daniel's last cardiac evaluation was on July 17, 2023. He is status post placement of a 26 mm Parker Rishabh valve in the pulmonary position. This was performed during a cardiac catheterization procedure on August 13, 2019.   He was accompanied to the office visit today by his mother.     Cardiac history: Daniel is status post a right modified Juan-Taussig shunt performed on February 17, 2003. On July 7, 2003, he underwent repair of Tetralogy of Fallot with patch closure of the ventricular septal defect, takedown of the Juan-Taussig shunt and ligation of the patent ductus arteriosus. A transannular right ventricular outflow tract patch was placed at this time. On January 18, 2011, Dr. Riley placed a 25 mm bovine pericardial valve in the pulmonary valve position. A portion of the bovine pericardium was utilized as a patch for the right ventricular outflow tract. Over time, he developed mild to moderate ana-valvar pulmonary stenosis and increasing ana-pulmonary insufficiency.  In December of 2017, Daniel had a follow-up cardiac MRI performed. The right ventricular ED volume was at the upper limits of normal (z-score =2.2); The RV ES volume was increased (z-score = 4.4). The left ventricular end systolic volume was mildly increased, z-score = 2.5. The left ventricular ejection fraction was 51%. The right ventricular ejection fraction was mildly to moderately decreased at 44%. 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 This area of the right ventricular outflow patch has poor contractility. Please see below for details.  Because of an increasing gradient across the ana-pulmonary valve, increasing ana-pulmonary regurgitation, and decreased right ventricular systolic performance, Daniel was referred for a cardiac catheterization procedure on August 13, 2019. He was noted to have a 30 mm mercury gradient across the right ventricular outflow tract and moderate to severe pulmonary regurgitation. No intracardiac shunts were identified. His cardiac index was 2.6 L/min/m2. During this procedure a 26 mm Parker Rishabh valve was placed in the prior surgical valve, in the pulmonary position, with less than a 10 mm HG residual gradient and no pulmonary insufficiency. He tolerated the procedure well and was discharged home with no adverse sequelae.  Daniel has been asymptomatic with reference to the cardiovascular system. He denies complaints of chest pain, palpitations, dizziness, shortness of breath or syncope. He remains overweight; indeed, he has gained approximately 10 kg since July 2023.  He is trying to be more active and trying to make more healthy food choices.  He is in his third year of college at [employer] studying mechanical engineering. He plans to go to the gym with his sister several times a week, going forward.  His only medication includes aspirin 325 mg once daily.  He has had no major intercurrent illnesses, hospitalizations or surgeries. 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 A brain MRI was performed, and by report was normal. He has some academic difficulties.  He adheres to by annual dental evaluations.  He adheres to SBE prophylaxis for dental procedures.  He has received the COVID-19 vaccine (Pfizer); he has received this season's influenza vaccine.  In the past, he was evaluated in pediatric endocrinology and found to have benign gynecomastia which required no further followup.  A review of systems was otherwise unremarkable.  There has been no interval family history.

## 2024-01-17 ENCOUNTER — APPOINTMENT (OUTPATIENT)
Dept: PEDIATRIC CARDIOLOGY | Facility: CLINIC | Age: 19
End: 2024-01-17

## 2024-06-02 ENCOUNTER — RESULT CHARGE (OUTPATIENT)
Age: 19
End: 2024-06-02

## 2024-06-11 ENCOUNTER — APPOINTMENT (OUTPATIENT)
Dept: PEDIATRIC CARDIOLOGY | Facility: CLINIC | Age: 19
End: 2024-06-11
Payer: COMMERCIAL

## 2024-06-11 VITALS
BODY MASS INDEX: 31.51 KG/M2 | HEART RATE: 68 BPM | HEIGHT: 66.54 IN | OXYGEN SATURATION: 97 % | DIASTOLIC BLOOD PRESSURE: 76 MMHG | WEIGHT: 198.42 LBS | SYSTOLIC BLOOD PRESSURE: 110 MMHG

## 2024-06-11 DIAGNOSIS — E66.9 OBESITY, UNSPECIFIED: ICD-10-CM

## 2024-06-11 DIAGNOSIS — Z98.890 OTHER SPECIFIED POSTPROCEDURAL STATES: ICD-10-CM

## 2024-06-11 DIAGNOSIS — Q21.3 TETRALOGY OF FALLOT: ICD-10-CM

## 2024-06-11 DIAGNOSIS — I37.1 NONRHEUMATIC PULMONARY VALVE INSUFFICIENCY: ICD-10-CM

## 2024-06-11 DIAGNOSIS — Q22.0 PULMONARY VALVE ATRESIA: ICD-10-CM

## 2024-06-11 PROCEDURE — 93320 DOPPLER ECHO COMPLETE: CPT

## 2024-06-11 PROCEDURE — 93303 ECHO TRANSTHORACIC: CPT

## 2024-06-11 PROCEDURE — 93325 DOPPLER ECHO COLOR FLOW MAPG: CPT

## 2024-06-11 PROCEDURE — 99214 OFFICE O/P EST MOD 30 MIN: CPT | Mod: 25

## 2024-06-11 PROCEDURE — 93000 ELECTROCARDIOGRAM COMPLETE: CPT

## 2024-06-11 NOTE — REASON FOR VISIT
[Follow-Up] : a follow-up visit for [S/P Cardiac Surgery] : status post cardiac surgery [S/P Catheterization] : status post catheterization [Tetralogy Of Fallot] : Tetralogy of Fallot [Pulmonary Valve Insufficiency] : pulmonary valve insufficiency [Patient] : patient [Mother] : mother

## 2024-06-12 PROBLEM — Z98.890 STATUS POST CARDIAC SURGERY: Status: ACTIVE | Noted: 2023-07-17

## 2024-06-12 PROBLEM — E66.9 OBESITY (BMI 30.0-34.9): Status: ACTIVE | Noted: 2024-01-02

## 2024-06-12 PROBLEM — I37.1 PULMONARY REGURGITATION: Status: ACTIVE | Noted: 2019-06-28

## 2024-06-12 NOTE — PHYSICAL EXAM
[General Appearance - Alert] : alert [General Appearance - In No Acute Distress] : in no acute distress [General Appearance - Well Developed] : well developed [General Appearance - Well-Appearing] : well appearing [Obese] : patient was observed to be obese [Outer Ear] : the ears and nose were normal in appearance [Examination Of The Oral Cavity] : mucous membranes were moist and pink [Oropharynx] : the oropharynx was normal [Respiration, Rhythm And Depth] : normal respiratory rhythm and effort [Auscultation Breath Sounds / Voice Sounds] : breath sounds clear to auscultation bilaterally [No Cough] : no cough [Chest Surgical / Traumatic Scar] : chest incision well healed [Sternotomy] : sternotomy [Heart Rate And Rhythm] : normal heart rate and rhythm [Heart Sounds Gallop] : no gallops [Arterial Pulses] : normal upper and lower extremity pulses with no pulse delay [Heart Sounds Click] : no clicks [Edema] : no edema [Capillary Refill Test] : normal capillary refill [Normal S1] : normal  [S2 Wide Splitting] : had wide splitting [Systolic] : systolic [II] : a grade 2/6 [LUSB] : LUSB [Ejection] : ejection [Abdomen Soft] : soft [Abnormal Walk] : normal gait [Nail Clubbing] : no clubbing  or cyanosis of the fingernails [] : no rash [Demonstrated Behavior - Infant Nonreactive To Parents] : interactive [Mood] : mood and affect were appropriate for age [Demonstrated Behavior] : normal behavior [FreeTextEntry1] : Striae on posterior thorax

## 2024-06-12 NOTE — CARDIOLOGY SUMMARY
[LVSF ___%] : LV Shortening Fraction [unfilled]% [Today's Date] : [unfilled] [FreeTextEntry1] : An electrocardiogram today shows a normal sinus rhythm at a rate of 68 bpm, with a normal axis and a right bundle branch block pattern. There was no ectopy seen on the surface electrocardiogram. In summary, the EKG showed no significant interval change. [FreeTextEntry2] : Summary:  1. 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 2. Status post takedown of right modified Juan-Taussig shunt, status post closure of anterior  malalignment ventricular septal defect and right ventricular outflow tract transannular patch. 3. No residual ventricular septal defect. 4. Status post surgical pulmonary valve replacement with bovine pericardial valve - January 18, 2011. 5. S/P placement of a 26 mm Rishabh valve in the pulmonary position, 8/13/2019. 6. Very mild neopulmonary stenosis. 7. Peak pulmonary valve gradient = 15.0 mmHg (mean grad = 7.5 mmHg). 8. Mild to moderate pulmonary valve regurgitation. 9. Mild tricuspid valve regurgitation, peak systolic instantaneous gradient 22.4 mmHg. 10. Right ventricular systolic pressure estimate = 27.4 mmHg (estimated right atrial pressure of 5 mmHg). 11. The estimated right ventricular pressure is approximately 27% systemic level, WNL. - no interval  change. 12. Normal right branch pulmonary artery, continuity of the left and right branch pulmonary arteries and normal left branch pulmonary artery. 13. Normal Doppler profile in right pulmonary artery and normal Doppler profile in left pulmonary artery. 14. Color Doppler demonstrates flow into the proximal right and left branch pulmonary arteries. 15. Mildly dilated aortic root. 16. Aortic sinuses of Valsalva dimension (systole) = 4.3 cm (z = 3.87). 17. Mild aortic valve regurgitation. 18. The aortic insufficiency jet is eccentric, and directed towards the VSD patch.  The aortic pressure half time is 841 msec, consistent with mild aortic insufficiency. 19. The sino-tubular junction is effaced. 20. Moderately dilated ascending aorta. 21. Ascending aorta dimension (systole) = 3.76 cm (z = 3.55). 22. Trivial mitral valve regurgitation. 23. There is dyskinetic, paradoxical ventricular septal wall motion. 24. The right ventricular myocardium appears mildly myopathic. The RV is globular and mildly to  moderately dilated. The decreased systolic excursion is more pronounced at the apex of the right  ventricle. 25. Mild global hypokinesia of the right ventricle. 26. Normal left ventricular size, morphology and systolic function. 27. Left ventricular ejection fraction by 5/6 Area x Length is normal at 57 %. 28. The LV volumes by this method were WNL. 29. No pericardial effusion. [de-identified] : pending  January 18, 2023: This 24 Holter monitor revealed predominant normal sinus rhythm with a right bundle branch block pattern at a rate of 47 - 205 bpm. The average heart rate was 93 bpm. Rare isolated premature atrial contractions were noted; occasional late cycle, premature ventricular contractions were seen.  December 6, 2021: This 24 Holter monitor revealed predominant normal sinus rhythm with a right bundle branch block pattern at a rate of 45 - 141 bpm. The average heart rate was 73 bpm. Rare isolated premature atrial contractions were noted; occasional late cycle, but multifocal, premature ventricular contractions were seen.  The PVCs were mostly isolated, with rare couplets.  April 5, 2021: This 24 Holter monitor revealed predominant normal sinus rhythm with a right bundle branch block pattern at a rate of 45 - 140 bpm. The average heart rate was 78 bpm. Rare isolated premature atrial contractions were noted; occasional isolated, unifocal  premature ventricular contractions were seen.  A few PVCs were interpolated, most were late cycle.  June 26, 2019: This 24 Holter monitor revealed predominant normal sinus rhythm with a right bundle branch block pattern at a rate of 49 - 160 bpm. The average heart rate was 89 bpm. Rare isolated premature atrial contractions were noted; occasional isolated, late cycle, unifocal  premature ventricular contractions were seen.  10/10/2018:This 24 Holter monitor revealed predominant normal sinus rhythm with a right bundle branch block pattern at a rate of 53 - 175 bpm. The average heart rate was 90 bpm. Rare isolated premature atrial contractions were noted;  occasional isolated, late cycle premature ventricular contractions were seen.  10/16/2017:  This 24 Holter monitor revealed predominant normal sinus rhythm with a right bundle branch block pattern at a rate of 49 - 171 bpm. The average heart rate was 82 bpm. Rare isolated premature atrial contractions were noted;  rare unifocal premature ventricular contractions were seen.  2/13/2017: This study revealed a predominant normal sinus rhythm with a right bundle branch block pattern. Rare premature atrial contractions were noted. Rare late cycle premature ventricular contractions of 2 morphologies were seen.  10/12/2015: The 24 hr Holter monitor revealed a predominant normal sinus rhythm with a right bundle branch block pattern. There were rare isolated premature atrial contractions and rare isolated, late cycle monomorphic premature ventricular contractions with no ventricular tachycardia. [de-identified] : August 4, 2021 [de-identified] : The cardiac MRI/MRA revealed mild plus ana-pulmonary regurgitation (18-24% PC; 21% by volume). The branch pulmonary arteries were of good size with no stenosis. There was an estimated flow of 54% of the right lung and 46% to the left lung.  The right ventricular ED volume (119 ml/m2) was at the upper limits of normal (z-score =2.3); The RV ES volume was increased. The left ventricular ejection fraction was 56.5%. The right ventricular ejection fraction was mildly decreased at 50%. 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 This area of the right ventricular outflow patch has poor contractility. The aortic root was dilated and measured 4.0 cm X 3.9 cm X 4.3 cm in systole (z-score=5.0). The ascending aorta was also dilated, 3.7 cm X 3.7 cm, Z score of 3.9. There was mild plus aortic regurgitation, RF of 23% by PC flows.\par  \par  December 20, 2017: The cardiac MRI/MRA revealed mild plus ana-pulmonary regurgitation (34% PC; 15% by volume). The branch pulmonary arteries were of good size with no stenosis. There was an estimated flow of 49% of the right lung and 51% to the left lung.  The right ventricular ED volume was at the upper limits of normal (z-score =2.2); The RV ES volume was increased (z-score = 4.4). The left ventricular ES volume was mildly increased, z-score = 2.5. The left ventricular ejection fraction was 51%. The right ventricular ejection fraction was mildly to moderately decreased at 44%. 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 This area of the right ventricular outflow patch has poor contractility. The aortic root was dilated and measured 3.6 cm X 3.7 cm X 4.1 cm in systole (z-score=5.0). The ascending aorta was also dilated. There was very mild aortic regurgitation.\par  \par  December 17, 2015:\par  The cardiac MRI/MRA revealed minimal ana-pulmonary regurgitation (3%). The branch pulmonary arteries were of good size with no stenosis. There was an estimated flow of 60% of the right lung and 40% to the left lung.  The right ventricular and left ventricular volumes were normal. The left ventricular ejection fraction was 52%. The right ventricular ejection fraction was moderately decreased at 39%. 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 This area of the right ventricular outflow patch has poor contractility. The aortic root was dilated and measured 3.7 cm in systole. The ascending aorta was also dilated. [de-identified] : 8/13/2019 [FreeTextEntry3] :  He was noted to have a 30 mm mercury gradient across the right ventricular outflow tract and moderate to severe pulmonary regurgitation. No intracardiac shunts were identified. His cardiac index was 2.6 L/min/m2. During this procedure a 26 mm Edward Rishabh valve was placed in the prior surgical valve, in the pulmonary position, with less than a 10 mm HG residual gradient and no pulmonary insufficiency.

## 2024-06-12 NOTE — DISCUSSION/SUMMARY
[Needs SBE Prophylaxis] : [unfilled]  needs bacterial endocarditis prophylaxis. SBE prophylaxis is indicated for dental and invasive ENT procedures. (Circulation. 2007; 116: 0547-7199) [Influenza vaccine is recommended] : Influenza vaccine is recommended [There are no changes in medication management.] : There are no changes in medication management [FreeTextEntry1] : In summary, Daniel is s/p repair of Tetralogy of Fallot, Pulmonary atresia, and s/p placement of a 25 mm bovine pericardial valve in the pulmonary position. He is now ~5 years status post placement of a 26 mm Parker Rishabh valve in the pulmonary position; this was performed during a cardiac catheterization procedure on August 13, 2019. His cardiac evaluation today was notable for no significant ana-pulmonary valve stenosis, PSIG=15 mmHg; mean gradient=8 mmHg, and mild to moderate ana-pulmonary valve regurgitation. The estimated right ventricular pressure is approximately 27% systemic level, WNL. The right ventricle is globular and appears mildly dilated with global hypokinesia. The right ventricular myocardium appears mildly myopathic. The RV is globular and mildly to moderately dilated. The decreased systolic excursion is more pronounced at the apex of the right ventricle. His aortic root remains moderately dilated and measures 4.3 cm, z-score of 3.87, with only mild regurgitation. His LV ejection fraction remains normal at 57%.  In summary, his echocardiogram today shows no significant interval change compared to that obtained in January 2023.  Daniel had a follow-up cardiac MRI/MRA in August 2021, (see above diagnostic test section for details on the results of this cardiac MRI/MRA).  The data on today's echocardiogram correlated quite nicely with the information obtained on this recent cardiac MRI/MRA.  At this time, Daniel has only mild neopulmonary stenosis and mild plus neopulmonary insufficiency with an estimated normal right ventricular pressure.  He has no evidence of branch pulmonary artery stenosis.  Also, his aortic root and ascending aorta are dilated, as is often seen in patients with tetralogy of Fallot.  These findings warrant close expectant follow-up, but no specific intervention at this point in time. He remains normotensive.    Daniel remains asymptomatic. He reports a very good activity level and has no complaints of dyspnea or shortness of breath with exertion. To date, we have documented no concerning arrhythmias and he has had no syncopal episodes.  His last 24-hour Holter monitor from January 2023, showed no concerning arrhythmias.  A 24-hour Holter monitor was placed today and is currently pending.  Daniel should remain on aspirin 325 mg once daily, indefinitely.  He denies excessive bruising or gum bleeding, on aspirin therapy.  A follow-up cardiac MRI/MRA will be planned if clinically indicated by either increasing neopulmonary valve gradients or worsening neopulmonary valve insufficiency.  I cautioned Daniel about his weight, and I discussed the importance of a heart healthy diet, as well as the importance of regular aerobic exercise, with an eye towards preventive cardiology. I emphasized the importance of consistent aerobic exercise at least 5 times per week.  I also emphasized the importance of excellent dental and skin hygiene, as prophylactic measures to prevent endocarditis. SBE prophylaxis is indicated. I reviewed with Daniel and his family that should he develop any protracted, unexplained fevers, that they should contact our office for advice and evaluation.  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  Today, I discussed at length with Daniel and his mother, the importance of transitioning his cardiology care to our adult congenital heart disease program.  He and his mother are in agreement with this transition.  They were provided with the contact information to make their next appointment in 6 months with Dr. Manuela Feng in the [facility] clinic.  I hope you find this information helpful to you.  Total time spent today included reviewing diagnosis and treatment plan/monitoring, review of prior notes, review of medications and monitoring for side effects, review of last labs with patient/family, plan for continued symptom and laboratory monitoring as ordered, and time spent with patient/parent. Reviewed recent chart notes from other providers and medical records as well. This excludes time spent on procedures.

## 2024-06-12 NOTE — HISTORY OF PRESENT ILLNESS
[FreeTextEntry1] : Daniel Gandara was evaluated at the [facility] on June 11, 2024. Daniel is now a 19 year-old young man who is followed in our division with a diagnosis of Tetralogy of Fallot and Pulmonary atresia. Daniel's last cardiac evaluation was on January 2, 2024. He is status post placement of a 26 mm Parker Rishabh valve in the pulmonary position. This was performed during a cardiac catheterization procedure on August 13, 2019.  He was accompanied to the office visit today by his mother.     Cardiac history: Daniel is status post a right modified Juan-Taussig shunt performed on February 17, 2003. On July 7, 2003, he underwent repair of Tetralogy of Fallot with patch closure of the ventricular septal defect, takedown of the Juan-Taussig shunt and ligation of the patent ductus arteriosus. A transannular right ventricular outflow tract patch was placed at this time. On January 18, 2011, Dr. Riley placed a 25 mm bovine pericardial valve in the pulmonary valve position. A portion of the bovine pericardium was utilized as a patch for the right ventricular outflow tract. Over time, he developed mild to moderate ana-valvar pulmonary stenosis and increasing ana-pulmonary insufficiency.  In December of 2017, Daniel had a follow-up cardiac MRI performed. The right ventricular ED volume was at the upper limits of normal (z-score =2.2); The RV ES volume was increased (z-score = 4.4). The left ventricular end systolic volume was mildly increased, z-score = 2.5. The left ventricular ejection fraction was 51%. The right ventricular ejection fraction was mildly to moderately decreased at 44%. 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 This area of the right ventricular outflow patch has poor contractility. Please see below for details.  Because of an increasing gradient across the ana-pulmonary valve, increasing ana-pulmonary regurgitation, and decreased right ventricular systolic performance, Daniel was referred for a cardiac catheterization procedure on August 13, 2019. He was noted to have a 30 mm mercury gradient across the right ventricular outflow tract and moderate to severe pulmonary regurgitation. No intracardiac shunts were identified. His cardiac index was 2.6 L/min/m2. During this procedure a 26 mm Parker Rishabh valve was placed in the prior surgical valve, in the pulmonary position, with less than a 10 mm HG residual gradient and no pulmonary insufficiency. He tolerated the procedure well and was discharged home with no adverse sequelae.  Present History:  Daniel denies complaints of chest pain, dizziness, shortness of breath or syncope.  On occasion, when waking up from sleep and prior to trying to fall back to sleep, Daniel reports "palpitations", which resolve readily.  He does not experience rapid heartbeats at any other times.  He remains overweight.  However, with some effort, he is lost approximately 5 kg since January 2024. He is trying to be more active and trying to make more healthy food choices.  He has completed his third year of college at [employer] and is studying mechanical engineering.   His only medication includes aspirin 325 mg once daily.  He has had no major intercurrent illnesses, hospitalizations or surgeries. 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 A brain MRI was performed, and by report was normal. He has some academic difficulties.  He adheres to bi-annual dental evaluations.  His next dental evaluation is on July 3, 2024.  He adheres to SBE prophylaxis for dental procedures.  His immunizations are up-to-date.  In the past, he was evaluated in pediatric endocrinology and found to have benign gynecomastia which required no further followup.  A review of systems was otherwise unremarkable.  Daniel has transferred his medical care to an adult internist.  There has been no interval family history.

## 2024-06-12 NOTE — CONSULT LETTER
[Today's Date] : [unfilled] [Name] : Name: [unfilled] [] : : ~~ [Today's Date:] : [unfilled] [Dear  ___:] : Dear Dr. [unfilled]: [Consult] : I had the pleasure of evaluating your patient, [unfilled]. My full evaluation follows. [Consult - Single Provider] : Thank you very much for allowing me to participate in the care of this patient. If you have any questions, please do not hesitate to contact me. [Sincerely,] : Sincerely, [FreeTextEntry4] : [facility] [FreeTextEntry5] :  [address] [FreeTextEntry6] :  [place] [address] [FreeTextEntry7] : [phone] [de-identified] : Lula Lim MD Pediatric Cardiologist [facility], [address], [place] [address] Phone: [phone] FAX: [phone]

## 2024-06-26 ENCOUNTER — APPOINTMENT (OUTPATIENT)
Dept: PEDIATRIC CARDIOLOGY | Facility: CLINIC | Age: 19
End: 2024-06-26

## 2024-06-26 PROCEDURE — 93224 XTRNL ECG REC UP TO 48 HRS: CPT

## 2024-07-05 ENCOUNTER — NON-APPOINTMENT (OUTPATIENT)
Age: 19
End: 2024-07-05

## 2025-03-04 ENCOUNTER — RESULT CHARGE (OUTPATIENT)
Age: 20
End: 2025-03-04

## 2025-03-05 ENCOUNTER — APPOINTMENT (OUTPATIENT)
Dept: PEDIATRIC CARDIOLOGY | Facility: CLINIC | Age: 20
End: 2025-03-05
Payer: COMMERCIAL

## 2025-03-05 VITALS
DIASTOLIC BLOOD PRESSURE: 78 MMHG | BODY MASS INDEX: 30.64 KG/M2 | OXYGEN SATURATION: 98 % | HEIGHT: 66.54 IN | RESPIRATION RATE: 20 BRPM | WEIGHT: 192.9 LBS | HEART RATE: 68 BPM | SYSTOLIC BLOOD PRESSURE: 114 MMHG

## 2025-03-05 DIAGNOSIS — Q21.3 TETRALOGY OF FALLOT: ICD-10-CM

## 2025-03-05 DIAGNOSIS — Q22.0 PULMONARY VALVE ATRESIA: ICD-10-CM

## 2025-03-05 DIAGNOSIS — I37.1 NONRHEUMATIC PULMONARY VALVE INSUFFICIENCY: ICD-10-CM

## 2025-03-05 DIAGNOSIS — I77.819 AORTIC ECTASIA, UNSPECIFIED SITE: ICD-10-CM

## 2025-03-05 PROCEDURE — 93325 DOPPLER ECHO COLOR FLOW MAPG: CPT

## 2025-03-05 PROCEDURE — 99204 OFFICE O/P NEW MOD 45 MIN: CPT

## 2025-03-05 PROCEDURE — 93320 DOPPLER ECHO COMPLETE: CPT

## 2025-03-05 PROCEDURE — 93303 ECHO TRANSTHORACIC: CPT

## 2025-03-05 RX ORDER — AMOXICILLIN 500 MG/1
500 TABLET, FILM COATED ORAL
Qty: 4 | Refills: 3 | Status: ACTIVE | COMMUNITY
Start: 2025-03-05 | End: 1900-01-01

## 2025-08-27 ENCOUNTER — APPOINTMENT (OUTPATIENT)
Dept: PEDIATRIC CARDIOLOGY | Facility: CLINIC | Age: 20
End: 2025-08-27
Payer: COMMERCIAL

## 2025-08-27 PROCEDURE — 94010 BREATHING CAPACITY TEST: CPT

## 2025-08-27 PROCEDURE — 94681 O2 UPTK CO2 OUTP % O2 XTRC: CPT

## 2025-08-27 PROCEDURE — 93015 CV STRESS TEST SUPVJ I&R: CPT
